# Patient Record
Sex: FEMALE | Race: WHITE | NOT HISPANIC OR LATINO
[De-identification: names, ages, dates, MRNs, and addresses within clinical notes are randomized per-mention and may not be internally consistent; named-entity substitution may affect disease eponyms.]

---
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
## 2023-08-22 NOTE — ED PEDIATRIC TRIAGE NOTE - CHIEF COMPLAINT QUOTE
Pt supposed to have gallbladder removed on the 21st, after scans done in May. Pt with right sided pain starting last night with similar pains. Pt states she feels nausea. No vomiting or fevers. No medications this morning.     PMH of ADHD, anxiety, Hashimoto's. IUTD.
[TextEntry] : This note was written by Patrick Bloom on 08/22/2023 actively solely ANNELIESE Armando M.D.  All medical record entries made by the Scribe were at my, ANNELIESE Armando M.D. direction and personally dictated by me on 08/22/2023. I have personally reviewed the chart and agree that the record reflects my personal performance of the history, physical exam, assessment, and plan.

## 2023-11-30 ENCOUNTER — APPOINTMENT (OUTPATIENT)
Dept: INTERNAL MEDICINE | Facility: CLINIC | Age: 18
End: 2023-11-30

## 2023-12-19 ENCOUNTER — APPOINTMENT (OUTPATIENT)
Dept: INTERNAL MEDICINE | Facility: CLINIC | Age: 18
End: 2023-12-19

## 2024-04-15 NOTE — DISCHARGE NOTE PEDIATRIC - MEDICATION SUMMARY - MEDICATIONS TO STOP TAKING
pt presents to ED c/o trip/fall over curb hitting face and scraping both elbows and R knee. no loc. tdap utd. Denies fever/chill/HA/dizziness/chest pain/palpitation/sob/abd pain/n/v/d/ black stool/bloody stool/urinary sxs
I will STOP taking the medications listed below when I get home from the hospital:  None
Patient/Caregiver provided printed discharge information.

## 2024-05-08 NOTE — ED PEDIATRIC NURSE NOTE - CHPI ED NUR DURATION
Pt ambulated to the treatment area with a steady gait. Pt states \"last night I tripped over my own feet causing a fall I did not hit my head or pass out. I had right shoulder pain right away I can't move my arm without extreme pain.\" Pt is guarding.   yesterday

## 2024-06-01 NOTE — ED PEDIATRIC NURSE REASSESSMENT NOTE - PAIN INTERVENTIONS
EMERGENCY DEPARTMENT HISTORY AND PHYSICAL EXAM        Date: 6/1/2024  Patient Name: Kushal Ruelas    History of Presenting Illness     Chief Complaint   Patient presents with    Cough    Nasal Congestion    Fatigue       History Provided By: History obtained from patient    HPI: Kushal Ruelas, 35 y.o. female presents to the ED with cc of cough x 3 days    Patient states that over the last 3 days she is had a cough with nasal congestion.  She has a history of asthma but denies any increased wheezing or shortness of breath.  She has Trelegy and albuterol inhalers at home.  Denies any fever, states that she was sweating at work yesterday and they sent her home.      No nausea, vomiting, diarrhea, fever, chills, chest pain, shortness of breath, leg swelling     There are no other complaints, changes, or physical findings at this time.    Records Reviewed: na    PCP: Angel Espinal MD    No current facility-administered medications on file prior to encounter.     Current Outpatient Medications on File Prior to Encounter   Medication Sig Dispense Refill    vitamin D (ERGOCALCIFEROL) 1.25 MG (24329 UT) CAPS capsule Take 1 capsule by mouth Once a week at 5 PM      albuterol sulfate HFA (PROVENTIL HFA) 108 (90 Base) MCG/ACT inhaler Inhale 2 puffs into the lungs every 6 hours as needed for Wheezing 18 g 3    HUMIRA PEN-CD/UC/HS STARTER 80 MG/0.8ML PNKT       Vitamin D (CHOLECALCIFEROL) 50 MCG (2000 UT) TABS tablet Take 1 tablet by mouth daily 60 tablet 1    levothyroxine (SYNTHROID) 25 MCG tablet Take 1 tablet by mouth Daily 30 tablet 1    triamterene-hydroCHLOROthiazide (MAXZIDE-25) 37.5-25 MG per tablet Take 1 tablet by mouth daily 30 tablet 1    amLODIPine (NORVASC) 10 MG tablet Take by mouth daily             Past History     Past Medical History:  Past Medical History:   Diagnosis Date    Asthma 5/13/2010    Asthma     COVID-19     Depression     Headache     Hypertension     Incomplete bladder emptying  positioning/multiple medication modalities/relaxation